# Patient Record
Sex: MALE | Race: WHITE | NOT HISPANIC OR LATINO | Employment: OTHER | URBAN - METROPOLITAN AREA
[De-identification: names, ages, dates, MRNs, and addresses within clinical notes are randomized per-mention and may not be internally consistent; named-entity substitution may affect disease eponyms.]

---

## 2024-02-20 ENCOUNTER — OFFICE VISIT (OUTPATIENT)
Dept: OTOLARYNGOLOGY | Facility: CLINIC | Age: 64
End: 2024-02-20
Payer: COMMERCIAL

## 2024-02-20 VITALS — BODY MASS INDEX: 25.14 KG/M2 | HEIGHT: 72 IN | TEMPERATURE: 97.9 F | WEIGHT: 185.63 LBS

## 2024-02-20 DIAGNOSIS — Q31.9 DYSPLASIA OF LARYNX: Primary | ICD-10-CM

## 2024-02-20 DIAGNOSIS — R49.8 VOICE FATIGUE: ICD-10-CM

## 2024-02-20 DIAGNOSIS — R49.8 OTHER VOICE AND RESONANCE DISORDERS: ICD-10-CM

## 2024-02-20 PROCEDURE — 31575 DIAGNOSTIC LARYNGOSCOPY: CPT | Performed by: OTOLARYNGOLOGY

## 2024-02-20 PROCEDURE — 99213 OFFICE O/P EST LOW 20 MIN: CPT | Performed by: OTOLARYNGOLOGY

## 2024-02-20 PROCEDURE — 1159F MED LIST DOCD IN RCRD: CPT | Performed by: OTOLARYNGOLOGY

## 2024-02-20 PROCEDURE — 1126F AMNT PAIN NOTED NONE PRSNT: CPT | Performed by: OTOLARYNGOLOGY

## 2024-02-20 RX ORDER — BUDESONIDE AND FORMOTEROL FUMARATE DIHYDRATE 160; 4.5 UG/1; UG/1
AEROSOL RESPIRATORY (INHALATION)
COMMUNITY
Start: 2023-01-26 | End: 2024-02-20 | Stop reason: ALTCHOICE

## 2024-02-20 RX ORDER — CYCLOBENZAPRINE HCL 10 MG
10 TABLET ORAL 3 TIMES DAILY PRN
COMMUNITY
Start: 2023-06-29 | End: 2024-02-20 | Stop reason: ALTCHOICE

## 2024-02-20 RX ORDER — ALBUTEROL SULFATE 90 UG/1
AEROSOL, METERED RESPIRATORY (INHALATION)
COMMUNITY
Start: 2023-01-26 | End: 2024-02-20 | Stop reason: ALTCHOICE

## 2024-02-20 RX ORDER — NAPROXEN 500 MG/1
TABLET ORAL
COMMUNITY
Start: 2023-06-29

## 2024-02-20 RX ORDER — PANTOPRAZOLE SODIUM 40 MG/1
40 TABLET, DELAYED RELEASE ORAL
COMMUNITY

## 2024-02-20 RX ORDER — ACETAMINOPHEN 500 MG/1
500 TABLET, FILM COATED ORAL
COMMUNITY
Start: 2023-07-06

## 2024-02-20 ASSESSMENT — PATIENT HEALTH QUESTIONNAIRE - PHQ9
1. LITTLE INTEREST OR PLEASURE IN DOING THINGS: NOT AT ALL
SUM OF ALL RESPONSES TO PHQ9 QUESTIONS 1 & 2: 0
2. FEELING DOWN, DEPRESSED OR HOPELESS: NOT AT ALL

## 2024-02-20 NOTE — PROGRESS NOTES
ASSESSMENT AND PLAN:   Silvio Hoff is a 64 y.o. 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 Pathology showed high grade dysplasia with a and a focus suspicious for invasion. He presents for a possible KTP laser ablation.    Today's examination to include narrowband imaging demonstrates no concerning lesion. There is a slight irregularity on the right TVC that is not concerning on my exam    We discussed observations vs additional testing vs procedure. He elected to hold off and have a repeat exam in 6 months.  He is doing well with his voice and minimal jaw opening. We had a lengthy discussion regarding concerns for swallow, voice concerns, and bleeding in the past.      I would like to see the patient back in   6 months        Reason For Consult  Chief Complaint   Patient presents with    potential KTP laser        HISTORY OF PRESENT ILLNESS:  Silvio Hoff is a 64 y.o. 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      The patient reports that he has been doing well.  No new jaw complatins.     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 Appears to be doing well with buccal cancer that is being followed by Dr. Soliz. No concerning laryngeal lesions on my strobe examination today. He has no concerning findings on narrow band imaging as well. We discussed smoking cessation but has been unsuccessful, he does not wish to quit at this time but was advised he should. He has leukoplakia tissue on left vocal cord - not invasive my my estimation. We will continue to watch closely.     I will see him back in 6 months or sooner if needed/voice changes.   Past Medical History  He has a past medical history of Personal history of other diseases of the  digestive system. Surgical History  He has a past surgical history that includes Other surgical history (03/08/2022) and Other surgical history (03/08/2022).   Social History  He reports that he has been smoking cigarettes. He does not have any smokeless tobacco history on file. No history on file for alcohol use and drug use. Allergies  Cat dander     Family History  No family history on file.    Review of Systems  All 10 systems were reviewed and negative except for above.      Last Recorded Vitals  Temperature 36.6 °C (97.9 °F), height 1.829 m (6'), weight 84.2 kg (185 lb 10 oz).    Physical Exam  ENT Physical Exam  Constitutional  Appearance: patient appears well-developed and well-nourished,  Head and Face  Appearance: head appears normal and face appears normal;  Ear  Auricles: right auricle normal; left auricle normal;  Nose  External Nose: nares patent bilaterally;  Oral Cavity/Oropharynx  Lips: normal;  Neck  Neck: neck normal; neck palpation normal;  Respiratory  Inspection: no retractions visible;  Cardiovascular  Inspection: no peripheral edema present;  Neurovestibular  Mental Status: alert and oriented;  Psychiatric: mood normal;  Cranial Nerves: cranial nerves intact;    Procedures   Flexible Laryngoscopy w/ Videostroboscopy    VOICE AND SPEECH CHARACTERISTICS:  Normal spoken speech, (+) mild dysphonia, (+) mild roughness, no breathiness, no asthenia, no strain.    Intelligibility: normal.   Resonance: balanced.   Vocal Loudness: normal.   Breath Support: normal.    PROCEDURE:    Indications: voice change  Procedure Note    Post-operative Diagnosis: same    Anesthesia: Lidocaine 2% and Jose-Synephrine 1/2%    Endoscopy Type:  Flexible Laryngoscopy    Procedure Details:    The patient was placed in the sitting position.  After topical anesthesia and decongestion, the 4 mm laryngoscope was passed.  The nasal cavities, nasopharynx, oropharynx, hypopharynx, and larynx were all examined.  Vocal cords were  examined during respiration and phonation.  The following findings were noted:      Condition:  Stable.  Patient tolerated procedure well.    Complications:  None  Patient is seated in the exam chair. After adequate topical anesthesia, I advance the flexible endoscope. The examination included evaluation of the mckeon, vallecula, base of tongue, pyriforms, post-cricoid area, larynx and immediate subglottis.  Findings : assessment of the nasopharynx, base of tongue/vallecula, pyriform sinuses, post-cricoid area and pharyngeal walls was without lesion or mass, pharyngeal wall contraction is normal and symmetric, and no pooling of secretions  ventricular obliteration: 2 (present)  Gross Arytenoid Movement: symmetric.  Arytenoid Height: normal.   Supraglottic Tension: lateral.  Closure: closed.  Additional Findings: Minimal leukoplakia, mild right medial vocal cord irregularities that are subtle in appearance.       Time Spent  Prep time on day of patient encounter: 10 minutes  Time spent directly with patient, family or caregiver: 15 minutes  Additional Time Spent on Patient Care Activities/Discussion with SLP re care plan: 5 minutes  Documentation Time: 10 minutes  Other Time Spent: 0 minutes  Total: 40 minutes       Scribe Attestation  By signing my name below, [email or web address] , Scribe attest that this documentation has been prepared under the direction and in the presence of Cristian Persaud MD.

## 2024-08-19 ENCOUNTER — OFFICE VISIT (OUTPATIENT)
Dept: OTOLARYNGOLOGY | Facility: CLINIC | Age: 64
End: 2024-08-19
Payer: COMMERCIAL

## 2024-08-19 VITALS — WEIGHT: 187.7 LBS | BODY MASS INDEX: 25.42 KG/M2 | TEMPERATURE: 97.2 F | HEIGHT: 72 IN

## 2024-08-19 DIAGNOSIS — J38.7 PRESBYLARYNGES: ICD-10-CM

## 2024-08-19 DIAGNOSIS — R49.0 VOICE HOARSENESS: ICD-10-CM

## 2024-08-19 DIAGNOSIS — J38.7 LEUKOPLAKIA OF LARYNX: ICD-10-CM

## 2024-08-19 DIAGNOSIS — R49.8 OTHER VOICE AND RESONANCE DISORDERS: ICD-10-CM

## 2024-08-19 DIAGNOSIS — Q31.9 DYSPLASIA OF LARYNX: Primary | ICD-10-CM

## 2024-08-19 DIAGNOSIS — R49.0 MUSCLE TENSION DYSPHONIA: ICD-10-CM

## 2024-08-19 PROCEDURE — 99214 OFFICE O/P EST MOD 30 MIN: CPT | Performed by: OTOLARYNGOLOGY

## 2024-08-19 PROCEDURE — 31579 LARYNGOSCOPY TELESCOPIC: CPT | Performed by: OTOLARYNGOLOGY

## 2024-08-19 PROCEDURE — 3008F BODY MASS INDEX DOCD: CPT | Performed by: OTOLARYNGOLOGY

## 2024-08-19 PROCEDURE — 1126F AMNT PAIN NOTED NONE PRSNT: CPT | Performed by: OTOLARYNGOLOGY

## 2024-08-19 PROCEDURE — 1159F MED LIST DOCD IN RCRD: CPT | Performed by: OTOLARYNGOLOGY

## 2024-08-19 RX ORDER — GABAPENTIN 300 MG/1
300 CAPSULE ORAL 2 TIMES DAILY
COMMUNITY

## 2024-08-19 ASSESSMENT — PATIENT HEALTH QUESTIONNAIRE - PHQ9
SUM OF ALL RESPONSES TO PHQ9 QUESTIONS 1 AND 2: 0
2. FEELING DOWN, DEPRESSED OR HOPELESS: NOT AT ALL
1. LITTLE INTEREST OR PLEASURE IN DOING THINGS: NOT AT ALL

## 2024-08-19 ASSESSMENT — PAIN SCALES - GENERAL: PAINLEVEL: 0-NO PAIN

## 2024-08-19 NOTE — PROGRESS NOTES
ASSESSMENT AND PLAN:   Silvio Hoff is a 64 y.o. male with a history of leukoplakia previously treated with biopsy showing high grade dysplasia.  6 month follow up.     Today's examination to include stroboscopy/ demonstrates no concerning features with good vibratory wave bilaterally. He has ascar on the right buccal mucosa without any overt changes.     He has tongue numbness/tingling due to Gabapentin. He will discuss this with his pain management service.     I would like to see the patient back in 1 year.         Reason For Consult  Chief Complaint   Patient presents with    Follow-up        HISTORY OF PRESENT ILLNESS:  Silvio Hoff is a 64 y.o. 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  Today, he reports his voice is about the same. No new jaw complaints. He has recently started having some occasional numbness in the tip of his tongue.      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 Pathology showed high grade dysplasia with a and a focus suspicious for invasion. He presents for a possible KTP laser ablation.     Today's examination to include narrowband imaging demonstrates no concerning lesion. There is a slight irregularity on the right TVC that is not concerning on my exam     We discussed observations vs additional testing vs procedure. He elected to hold off and have a repeat exam in 6 months.     Past Medical History  He has a past medical history of Personal history of other diseases of the digestive system. Surgical History  He has a past surgical history that includes Other surgical history (03/08/2022) and Other surgical history (03/08/2022).   Social History  He reports that he has been smoking cigarettes. He has never used smokeless tobacco. He reports current alcohol use. He reports that he  does not use drugs. Allergies  Cat dander     Family History  No family history on file.    Review of Systems  All 10 systems were reviewed and negative except for above.      Last Recorded Vitals  Temperature 36.2 °C (97.2 °F), height 1.829 m (6'), weight 85.1 kg (187 lb 11.2 oz).    Physical Exam  ENT Physical Exam  Constitutional  Appearance: patient appears well-developed and well-nourished,  Head and Face  Appearance: head appears normal and face appears normal;  Ear  Auricles: right auricle normal; left auricle normal;  Nose  External Nose: nares patent bilaterally;  Oral Cavity/Oropharynx  Lips: normal;  Neck  Neck: neck normal; neck palpation normal;  Respiratory  Inspection: no retractions visible;  Cardiovascular  Inspection: no peripheral edema present;  Neurovestibular  Mental Status: alert and oriented;  Psychiatric: mood normal;  Cranial Nerves: cranial nerves intact;             Procedures     Flexible Laryngoscopy w/ Videostroboscopy    VOICE AND SPEECH CHARACTERISTICS:  Normal spoken speech, (+) mild dysphonia, no roughness, (+) mild breathiness, (+) mild asthenia, (+) mild strain.      Intelligibility: normal.   Resonance: balanced.   Vocal Loudness: normal.   Breath Support: normal.    PROCEDURE:    Indications: voice change  PROCEDURE NOTE: FLEXIBLE LARYNGOSCOPY WITH STROBOSCOPY  I recommended a flexible laryngoscopy with stroboscopy based on PE findings, and/or concern for mucosal wave details based upon history and/or for issues associated with hyperreflexic gag on mirror exam concerning for pathology. Risks, benefits, and alternatives were explained. The patient wishes to proceed and gives verbal consent.   Patient is seated in the exam chair. After adequate topical anesthesia, I advance the flexible endoscope. The examination included evaluation of the mckeon, vallecula, base of tongue, pyriforms, post-cricoid area, larynx and immediate subglottis.  Findings : assessment of the nasopharynx,  base of tongue/vallecula, pyriform sinuses, post-cricoid area and pharyngeal walls was without lesion or mass, pharyngeal wall contraction is normal and symmetric, and no pooling of secretions  subglottic edema:2 (present), ventricular obliteration: 2 (present), erythema/hyperemia: 2 (arytenoids), IA thickenin (mild), and TVC edema: 1 (mild)  Gross Arytenoid Movement: symmetric.  Arytenoid Height: normal.   Supraglottic Tension: lateral and ant/post.  Symmetry: asymmetry.   Amplitude: reduced: right.  Phase Closure: in-phase.  Mucosal Wave Lateral Excursion/Secondary Wave: Right Vocal Cord: mild restriction - Wave moved more than ¼, less than ½ the width of the vocal fold.  Periodicity: normal.  Closure: bowing.      Time Spent  Prep time on day of patient encounter: 10 minutes  Time spent directly with patient, family or caregiver: 15 minutes  Additional Time Spent on Patient Care Activities/Discussion with SLP re care plan: 5 minutes  Documentation Time: 10 minutes  Other Time Spent: 0 minutes  Total: 40 minutes       Scribe Attestation  By signing my name below, Earnestine MATIAS , Scribe attest that this documentation has been prepared under the direction and in the presence of Cristian Persaud MD.

## 2024-08-20 ENCOUNTER — APPOINTMENT (OUTPATIENT)
Dept: OTOLARYNGOLOGY | Facility: CLINIC | Age: 64
End: 2024-08-20
Payer: COMMERCIAL

## 2025-08-18 ENCOUNTER — OFFICE VISIT (OUTPATIENT)
Dept: OTOLARYNGOLOGY | Facility: CLINIC | Age: 65
End: 2025-08-18
Payer: COMMERCIAL

## 2025-08-18 VITALS
TEMPERATURE: 97.5 F | BODY MASS INDEX: 25.98 KG/M2 | HEIGHT: 72 IN | WEIGHT: 191.8 LBS | DIASTOLIC BLOOD PRESSURE: 89 MMHG | SYSTOLIC BLOOD PRESSURE: 126 MMHG

## 2025-08-18 DIAGNOSIS — R49.0 MUSCLE TENSION DYSPHONIA: ICD-10-CM

## 2025-08-18 DIAGNOSIS — J38.7 LEUKOPLAKIA OF LARYNX: ICD-10-CM

## 2025-08-18 DIAGNOSIS — R49.8 OTHER VOICE AND RESONANCE DISORDERS: ICD-10-CM

## 2025-08-18 DIAGNOSIS — J38.7 PRESBYLARYNGES: ICD-10-CM

## 2025-08-18 DIAGNOSIS — R49.0 VOICE HOARSENESS: ICD-10-CM

## 2025-08-18 DIAGNOSIS — Q31.9 DYSPLASIA OF LARYNX: Primary | ICD-10-CM

## 2025-08-18 PROCEDURE — 31579 LARYNGOSCOPY TELESCOPIC: CPT | Performed by: OTOLARYNGOLOGY

## 2025-08-18 PROCEDURE — 3008F BODY MASS INDEX DOCD: CPT | Performed by: OTOLARYNGOLOGY

## 2025-08-18 PROCEDURE — 1159F MED LIST DOCD IN RCRD: CPT | Performed by: OTOLARYNGOLOGY

## 2025-08-18 PROCEDURE — 99214 OFFICE O/P EST MOD 30 MIN: CPT | Performed by: OTOLARYNGOLOGY

## 2025-08-18 PROCEDURE — 99214 OFFICE O/P EST MOD 30 MIN: CPT | Mod: 25 | Performed by: OTOLARYNGOLOGY
